# Patient Record
Sex: FEMALE | Race: WHITE
[De-identification: names, ages, dates, MRNs, and addresses within clinical notes are randomized per-mention and may not be internally consistent; named-entity substitution may affect disease eponyms.]

---

## 2018-01-10 ENCOUNTER — HOSPITAL ENCOUNTER (EMERGENCY)
Dept: HOSPITAL 75 - ER | Age: 23
LOS: 1 days | Discharge: HOME | End: 2018-01-11
Payer: SELF-PAY

## 2018-01-10 VITALS — BODY MASS INDEX: 53.92 KG/M2 | HEIGHT: 62 IN | WEIGHT: 293 LBS

## 2018-01-10 DIAGNOSIS — Z86.14: ICD-10-CM

## 2018-01-10 DIAGNOSIS — L02.414: ICD-10-CM

## 2018-01-10 DIAGNOSIS — Z90.89: ICD-10-CM

## 2018-01-10 DIAGNOSIS — L03.114: Primary | ICD-10-CM

## 2018-01-10 DIAGNOSIS — J45.909: ICD-10-CM

## 2018-01-10 DIAGNOSIS — I10: ICD-10-CM

## 2018-01-11 VITALS — DIASTOLIC BLOOD PRESSURE: 102 MMHG | SYSTOLIC BLOOD PRESSURE: 148 MMHG

## 2018-01-11 NOTE — ED GENERAL
General


Chief Complaint:  Skin/Wound Problems


Stated Complaint:  ARM WOUND


Nursing Triage Note:  


pt reports warm, red, tender area to left upper arm x 2 days.


Nursing Sepsis Screen:  No Definite Risk


 (TOÑO HENRY MEDICAL STUDENT)





History of Present Illness


Time Seen by Provider:  00:30


Initial Comments


Pt is a 23 yo female with PMH of MRSA who presents to the ED via PV c/o an 

abscess on the inside of her L upper arm that started ~ 3 days ago. Pt states 

she has just used some warm compresses and has been "squeezing" it a few times 

a day. Hasn't taken any medication for pain. Pt has a hx of MRSA abscesses and 

states that she just moved here to [place] a few months ago and has been getting 

more of these since moving here. Denies any fevers, chills, or any other 

associated sx.


 (TOÑO HENRY MEDICAL STUDENT)





Allergies and Home Medications


Allergies


Coded Allergies:  


     No Known Drug Allergies (Unverified , 1/10/18)





Home Medications


Sulfamethoxazole/Trimethoprim 1 Each Tablet, 1 EACH PO BID, #14


   Prescribed by: ROSANNA LEWIS on 1/11/18 0045





Constitutional:  no symptoms reported, No chills, No diaphoresis


EENTM:  no symptoms reported, No ear pain, No mouth pain, No throat pain


Respiratory:  no symptoms reported, No cough, No dyspnea on exertion


Cardiovascular:  no symptoms reported, No chest pain, No palpitations


Gastrointestinal:  no symptoms reported, No abdominal pain, No constipation, No 

diarrhea


Skin:  change in color (redness), other (abscess on L arm) (TOÑO HENRY MEDICAL 

STUDENT)





All Other Systems Reviewed


Negative Unless Noted:  Yes (Negative excepted noted.)


 (TOÑO HENRY MEDICAL STUDENT)





Past Medical-Social-Family Hx


Patient Social History


Alcohol Use:  Denies Use


Recreational Drug Use:  No


Smoking Status:  Never a Smoker


Recent Foreign Travel:  No


Contact w/Someone Who Travel:  No


Recent Infectious Disease Expo:  No


Recent Hopitalizations:  No


Physical Abuse:  No


Sexual Abuse:  No


Mistreated:  No


Fear:  No


 (TOÑO HENRY MEDICAL STUDENT)





Seasonal Allergies


Seasonal Allergies:  No


 (HENRY,ROSS MEDICAL STUDENT)





Surgeries


History of Surgeries:  Yes


Surgeries:  Adenoidectomy, Tonsillectomy


 (TOÑO HENRY MEDICAL STUDENT)





Respiratory


History of Respiratory Disorde:  Yes


Respiratory Disorders:  Asthma


 (TOÑO HENRY MEDICAL STUDENT)





Cardiovascular


History of Cardiac Disorders:  Yes


Cardiac Disorders:  Hypertension


 (TOÑO HENRY MEDICAL STUDENT)





Reproductive System


Last Menstrual Period:  Dec 21, 2017


 (TOÑO HENRY MEDICAL STUDENT)





Psychosocial


Suicide Risk Score:  0


 (TOÑO HENRY MEDICAL STUDENT)





Physical Exam


Vital Signs





Vital Sign - Last 12Hours








 1/10/18





 22:34


 


Temp 97.8


 


Pulse 100


 


Resp 18


 


B/P (MAP) 160/102 (121)








 (ROSANNA LEONARD MD)


Vital Signs


Capillary Refill : Less Than 3 Seconds 


 (TOÑO HENRY MEDICAL STUDENT)


General Appearance:  No Apparent Distress, WD/WN, Obese


HEENT:  PERRL/EOMI, Pharynx Normal


Neck:  Full Range of Motion, Normal Inspection


Respiratory:  Lungs Clear, Normal Breath Sounds, No Accessory Muscle Use


Cardiovascular:  Regular Rate, Rhythm, No Murmur


Gastrointestinal:  Normal Bowel Sounds, Non Tender, No Guarding, No Rebound


Extremity:  Normal Capillary Refill, Normal Range of Motion, Non Tender, No 

Calf Tenderness


Neurologic/Psychiatric:  Oriented x3, No Motor/Sensory Deficits


Skin:  Warm/Dry, Other (1x2cm abscess on L upper arm, medial surrounded by a 

5cm diameter area of erythema) (TOÑO HENRY MEDICAL STUDENT)





Progress/Results/Core Measures


Suspected Sepsis


Recent Fever Within 48 Hours:  No


Infection Criteria Present:  Suspected New Infection


New/Unexplained  Altered Menta:  No


Sepsis Screen:  No Definite Risk


Sepsis Diagnosis:  


SIRS


Temperature:97.8 


Pulse: 100 


Respiratory Rate: 18


 


Blood Pressure 160 /102 


Mean: 121


 


 (TOÑO HENRY MEDICAL STUDENT)





Results/Orders


My Orders





Orders - ROSANNA LEONARD MD


Sulfamethoxazole/Trimet Ds Tab (Bactrim (1/11/18 00:15)


 (ROSANNA LEONARD MD)


Vital Signs/I&O





Vital Sign - Last 12Hours








 1/10/18





 22:34


 


Temp 97.8


 


Pulse 100


 


Resp 18


 


B/P (MAP) 160/102 (121)








 (ROSANNA LEONARD MD)


Vital Signs/I&O


Capillary Refill : Less Than 3 Seconds 


 (TOÑO HENRY MEDICAL STUDENT)








Blood Pressure Mean:  121











Departure


Impression


Impression:  


 Primary Impression:  


 Cellulitis and abscess of upper arm and forearm


 Additional Impression:  


 Encounter for incision and drainage procedure


Disposition:  01 HOME, SELF-CARE


Condition:  Improved





Departure-Patient Inst.


Decision time for Depature:  00:44


 (ROSANNA LEONARD MD)


Referrals:  


NO,LOCAL PHYSICIAN (PCP)


Primary Care Physician


Patient Instructions:  Abscess Incision and Drainage (DC), Cellulitis (Skin 

Infection), Adult (DC)





Add. Discharge Instructions:  


Complete your antibiotics as prescribed.  Continue to use warm soaks in the 

bathtub or warm compresses multiple times a day to encourage draining.  Keep 

covered as long as the wound is draining.  Return to care if symptoms worsen.


To prevent future abscesses, consider bathing once or twice a week with 

chlorhexidine soap (Hibiclens).  This can be found over-the-counter at 

pharmacies or [facility].











All discharge instructions reviewed with patient and/or family. Voiced 

understanding.


Scripts


Sulfamethoxazole/Trimethoprim (Bactrim Ds Tablet) 1 Each Tablet


1 EACH PO BID, #14 TAB


   Prov: ROSANNA LEONARD MD         1/11/18











TOÑO HENRY MEDICAL STUDENT Jan 11, 2018 00:33


ROSANNA LEONARD MD Jan 11, 2018 00:46

## 2018-03-16 ENCOUNTER — HOSPITAL ENCOUNTER (EMERGENCY)
Dept: HOSPITAL 75 - ER | Age: 23
Discharge: HOME | End: 2018-03-16
Payer: SELF-PAY

## 2018-03-16 VITALS — SYSTOLIC BLOOD PRESSURE: 137 MMHG | DIASTOLIC BLOOD PRESSURE: 99 MMHG

## 2018-03-16 VITALS — BODY MASS INDEX: 53.92 KG/M2 | WEIGHT: 293 LBS | HEIGHT: 62 IN

## 2018-03-16 DIAGNOSIS — Z90.89: ICD-10-CM

## 2018-03-16 DIAGNOSIS — I10: ICD-10-CM

## 2018-03-16 DIAGNOSIS — F41.9: ICD-10-CM

## 2018-03-16 DIAGNOSIS — R10.13: Primary | ICD-10-CM

## 2018-03-16 DIAGNOSIS — F32.9: ICD-10-CM

## 2018-03-16 LAB
ALBUMIN SERPL-MCNC: 4.2 GM/DL (ref 3.2–4.5)
ALP SERPL-CCNC: 61 U/L (ref 40–136)
ALT SERPL-CCNC: 53 U/L (ref 0–55)
BASOPHILS # BLD AUTO: 0.1 10^3/UL (ref 0–0.1)
BASOPHILS NFR BLD AUTO: 1 % (ref 0–10)
BILIRUB SERPL-MCNC: 0.8 MG/DL (ref 0.1–1)
BUN/CREAT SERPL: 29
CALCIUM SERPL-MCNC: 9.5 MG/DL (ref 8.5–10.1)
CHLORIDE SERPL-SCNC: 105 MMOL/L (ref 98–107)
CO2 SERPL-SCNC: 21 MMOL/L (ref 21–32)
CREAT SERPL-MCNC: 0.66 MG/DL (ref 0.6–1.3)
EOSINOPHIL # BLD AUTO: 0.4 10^3/UL (ref 0–0.3)
EOSINOPHIL NFR BLD AUTO: 4 % (ref 0–10)
ERYTHROCYTE [DISTWIDTH] IN BLOOD BY AUTOMATED COUNT: 13.5 % (ref 10–14.5)
GFR SERPLBLD BASED ON 1.73 SQ M-ARVRAT: > 60 ML/MIN
GLUCOSE SERPL-MCNC: 103 MG/DL (ref 70–105)
HCT VFR BLD CALC: 40 % (ref 35–52)
HGB BLD-MCNC: 13.5 G/DL (ref 11.5–16)
LYMPHOCYTES # BLD AUTO: 3.1 X 10^3 (ref 1–4)
LYMPHOCYTES NFR BLD AUTO: 28 % (ref 12–44)
MANUAL DIFFERENTIAL PERFORMED BLD QL: NO
MCH RBC QN AUTO: 28 PG (ref 25–34)
MCHC RBC AUTO-ENTMCNC: 34 G/DL (ref 32–36)
MCV RBC AUTO: 82 FL (ref 80–99)
MONOCYTES # BLD AUTO: 1 X 10^3 (ref 0–1)
MONOCYTES NFR BLD AUTO: 9 % (ref 0–12)
NEUTROPHILS # BLD AUTO: 6.3 X 10^3 (ref 1.8–7.8)
NEUTROPHILS NFR BLD AUTO: 58 % (ref 42–75)
PLATELET # BLD: 238 10^3/UL (ref 130–400)
PMV BLD AUTO: 10.3 FL (ref 7.4–10.4)
POTASSIUM SERPL-SCNC: 4.4 MMOL/L (ref 3.6–5)
PROT SERPL-MCNC: 7.7 GM/DL (ref 6.4–8.2)
RBC # BLD AUTO: 4.88 10^6/UL (ref 4.35–5.85)
SODIUM SERPL-SCNC: 137 MMOL/L (ref 135–145)
WBC # BLD AUTO: 10.8 10^3/UL (ref 4.3–11)

## 2018-03-16 PROCEDURE — 84703 CHORIONIC GONADOTROPIN ASSAY: CPT

## 2018-03-16 PROCEDURE — 85025 COMPLETE CBC W/AUTO DIFF WBC: CPT

## 2018-03-16 PROCEDURE — 71046 X-RAY EXAM CHEST 2 VIEWS: CPT

## 2018-03-16 PROCEDURE — 85379 FIBRIN DEGRADATION QUANT: CPT

## 2018-03-16 PROCEDURE — 93005 ELECTROCARDIOGRAM TRACING: CPT

## 2018-03-16 PROCEDURE — 36415 COLL VENOUS BLD VENIPUNCTURE: CPT

## 2018-03-16 PROCEDURE — 80053 COMPREHEN METABOLIC PANEL: CPT

## 2018-03-16 NOTE — ED GENERAL
General


Chief Complaint:  Psych/Social Disorder


Stated Complaint:  CHEST DISCOMFORT/COUGH


Source of Information:  Patient


Exam Limitations:  No Limitations





History of Present Illness


Date Seen by Provider:  Mar 16, 2018


Time Seen by Provider:  08:05


Initial Comments


Here with report of central chest discomfort or epigastric discomfort.  This 

woke her up this morning.  She felt short of breath and had burning pain.  She 

is not sure what this is.  She is very anxious with this.  She had an episode 

like this previously long time ago but has not had any since.  They told her it 

was a panic attack.  She reports sleeping on the couch.  States the previous 

episode occurred where she was sleeping as well.  Currently feeling a little 

better.  Reports that she had a recent trip to [place] and back a week ago.


Timing/Duration:  1/2 Hour


Severity:  Moderate


Associated Systoms:  Chest Pain, No Diaphoresis, No Fever/Chills, No Nausea/

Vomiting, Shortness of Air, No Weakness





Allergies and Home Medications


Allergies


Coded Allergies:  


     No Known Drug Allergies (Unverified , 1/10/18)





Home Medications


Sulfamethoxazole/Trimethoprim 1 Each Tablet, 1 EACH PO BID


   Prescribed by: ROSANNA LEWIS on 18 0045





Patient Home Medication List


Home Medication List Reviewed:  Yes





Constitutional:  see HPI, No chills, No fever


EENTM:  no symptoms reported


Respiratory:  No cough, short of breath, No wheezing


Cardiovascular:  No chest pain, No edema


Gastrointestinal:  abdominal pain (epigastric), No nausea, No vomiting


Genitourinary:  no symptoms reported, No dysuria


Musculoskeletal:  no symptoms reported


Skin:  no symptoms reported


Psychiatric/Neurological:  Anxiety, Denies Emotional Problems


Hematologic/Lymphatic:  No Symptoms Reported


All Other Systems Reviewed


Negative Unless Noted:  Yes





Past Medical-Social-Family Hx


Patient Social History


Alcohol Use:  Denies Use


Recreational Drug Use:  No


Smoking Status:  Never a Smoker


2nd Hand Smoke Exposure:  No


Recent Foreign Travel:  No


Contact w/Someone Who Travel:  No


Recent Hopitalizations:  No





Seasonal Allergies


Seasonal Allergies:  No





Surgeries


History of Surgeries:  Yes


Surgeries:  Adenoidectomy, Tonsillectomy





Respiratory


History of Respiratory Disorde:  Yes


Respiratory Disorders:  Asthma





Cardiovascular


History of Cardiac Disorders:  Yes


Cardiac Disorders:  Hypertension





Genitourinary


History of Genitourinary Disor:  No





Gastrointestinal


History of Gastrointestinal Di:  No





Musculoskeletal


History of Musculoskeletal Dis:  No





Psychosocial


Behavioral Health Disorders:  Anxiety, Depression





Reviewed Nursing Assessment


Reviewed/Agree w Nursing PMH:  Yes





Family Medical History


Significant Family History:  No Pertinent Family Hx





Physical Exam


Vital Signs





Vital Signs - First Documented








 3/16/18





 08:11


 


Temp 97.5


 


Pulse 85


 


Resp 20


 


B/P (MAP) 178/114 (135)


 


Pulse Ox 100


 


O2 Delivery Room Air





Capillary Refill :


General Appearance:  No Apparent Distress, WD/WN, Obese


HEENT:  PERRL/EOMI, Pharynx Normal


Neck:  Non Tender, Supple


Respiratory:  Lungs Clear, Normal Breath Sounds


Cardiovascular:  Regular Rate, Rhythm, No Murmur


Gastrointestinal:  Non Tender, Soft


Back:  Normal Inspection, No CVA Tenderness, No Vertebral Tenderness


Extremity:  Normal Range of Motion, Non Tender


Neurologic/Psychiatric:  Alert, Oriented x3


Skin:  Normal Color, Warm/Dry





Progress/Results/Core Measures


Suspected Sepsis


SIRS


Temperature: 


Pulse:  


Respiratory Rate: 


 


Laboratory Tests


3/16/18 08:53: White Blood Count 10.8


Blood Pressure  / 


Mean: 


 





Laboratory Tests


3/16/18 08:53: 


Creatinine 0.66, Platelet Count 238, Total Bilirubin 0.8








Results/Orders


Lab Results





Laboratory Tests








Test


  3/16/18


08:53 Range/Units


 


 


White Blood Count


  10.8 


  4.3-11.0


10^3/uL


 


Red Blood Count


  4.88 


  4.35-5.85


10^6/uL


 


Hemoglobin 13.5  11.5-16.0  G/DL


 


Hematocrit 40  35-52  %


 


Mean Corpuscular Volume 82  80-99  FL


 


Mean Corpuscular Hemoglobin 28  25-34  PG


 


Mean Corpuscular Hemoglobin


Concent 34 


  32-36  G/DL


 


 


Red Cell Distribution Width 13.5  10.0-14.5  %


 


Platelet Count


  238 


  130-400


10^3/uL


 


Mean Platelet Volume 10.3  7.4-10.4  FL


 


Neutrophils (%) (Auto) 58  42-75  %


 


Lymphocytes (%) (Auto) 28  12-44  %


 


Monocytes (%) (Auto) 9  0-12  %


 


Eosinophils (%) (Auto) 4  0-10  %


 


Basophils (%) (Auto) 1  0-10  %


 


Neutrophils # (Auto) 6.3  1.8-7.8  X 10^3


 


Lymphocytes # (Auto) 3.1  1.0-4.0  X 10^3


 


Monocytes # (Auto) 1.0  0.0-1.0  X 10^3


 


Eosinophils # (Auto)


  0.4 H


  0.0-0.3


10^3/uL


 


Basophils # (Auto)


  0.1 


  0.0-0.1


10^3/uL


 


D-Dimer


  0.42 


  0.00-0.49


UG/ML


 


Sodium Level 137  135-145  MMOL/L


 


Potassium Level 4.4  3.6-5.0  MMOL/L


 


Chloride Level 105    MMOL/L


 


Carbon Dioxide Level 21  21-32  MMOL/L


 


Anion Gap 11  5-14  MMOL/L


 


Blood Urea Nitrogen 19 H 7-18  MG/DL


 


Creatinine


  0.66 


  0.60-1.30


MG/DL


 


Estimat Glomerular Filtration


Rate > 60 


   


 


 


BUN/Creatinine Ratio 29   


 


Glucose Level 103    MG/DL


 


Calcium Level 9.5  8.5-10.1  MG/DL


 


Total Bilirubin 0.8  0.1-1.0  MG/DL


 


Aspartate Amino Transf


(AST/SGOT) 39 H


  5-34  U/L


 


 


Alanine Aminotransferase


(ALT/SGPT) 53 


  0-55  U/L


 


 


Alkaline Phosphatase 61    U/L


 


Total Protein 7.7  6.4-8.2  GM/DL


 


Albumin 4.2  3.2-4.5  GM/DL








My Orders





Orders - OTTO RODRIGES MD


Lidocaine 2% Viscous 15 Ml (Xylocaine Vi (3/16/18 08:15)


Antacid  Suspension (Mylanta  Suspension (3/16/18 08:15)


Urine Pregnancy Bedside (3/16/18 08:10)


Ekg Tracing (3/16/18 08:10)


Chest Pa/Lat (2 View) (3/16/18 08:10)


Cbc With Automated Diff (3/16/18 08:10)


Comprehensive Metabolic Panel (3/16/18 08:10)


Fibrin Degradation Products (3/16/18 08:10)





Medications Given in ED





Current Medications








 Medications  Dose


 Ordered  Sig/Deshaun


 Route  Start Time


 Stop Time Status Last Admin


Dose Admin


 


 Al Hydrox/Mg


 Hydrox/Simethicone  30 ml  ONCE  ONCE


 PO  3/16/18 08:15


 3/16/18 08:16 DC 3/16/18 08:40


30 ML


 


 Lidocaine HCl  15 ml  ONCE  ONCE


 PO  3/16/18 08:15


 3/16/18 08:16  3/16/18 08:40


15 ML








Vital Signs/I&O





Vital Sign - Last 12Hours








 3/16/18





 08:11


 


Temp 97.5


 


Pulse 85


 


Resp 20


 


B/P (MAP) 178/114 (135)


 


Pulse Ox 100


 


O2 Delivery Room Air





Capillary Refill :


Progress Note :  


Progress Note


Seen and evaluated.  EKG, chest x-ray and labs ordered.  UCG ordered.  GI 

cocktail ordered.  Monitor patient..





Cardiac echo.  0955: No acute findings on labs or chest x-ray.  Discharged home 

with return precautions.  Patient verbalized understanding instructions and 

agreement with plan.  Pepcid 20 mg IV given prior to discharge for some mild 

reflux symptoms that are starting to return.





ECG


Initial ECG Impression Date:  Mar 16, 2018


Initial ECG Impression Time:  08:21


Initial ECG Rate:  84


Initial ECG Rhythm:  Normal Sinus


Initial ECG Impression:  Normal


Initial ECG Comparisson:  No Previous ECG Available


Comment


Sinus rhythm with inferior Q waves.  No evidence of ST elevation MI.  No 

previous available for comparison.  Interpreted by me.





Diagnostic Imaging





   Diagonstic Imaging:  Xray


   Plain Films/CT/US/NM/MRI:  chest


Comments


 [facility].


 [place], [place]





NAME:   PHAM DEE


MED REC#:   [id number]


ACCOUNT#:   [id number]


PT STATUS:   REG ER


:   1995


PHYSICIAN:   OTTO RODRIGES MD


ADMIT DATE:   18/ER


 ***Draft***


Date of Exam:18





CHEST PA/LAT (2 VIEW)








INDICATION: Chest pain.





FINDINGS:  


The lungs are clear. The heart size and vascularity are within


normal limits. There is no effusion or pneumothorax. 





IMPRESSION:


No acute appearing abnormality.





  Dictated on workstation # [id number]








Dict:   18 [id number]


Trans:   18 0952


[facility] [id number]





Interpreted by:     ALBERTO FRENCH


Electronically signed by:





Departure


Impression


Impression:  


 Primary Impression:  


 Epigastric pain


Disposition:  01 HOME, SELF-CARE


Condition:  Improved





Departure-Patient Inst.


Decision time for Depature:  10:02


Referrals:  


NO,LOCAL PHYSICIAN (PCP/Family)


Primary Care Physician


Patient Instructions:  Acid Reflux (Gastroesophageal Reflux Disease) in Adults, 

Acute Abdomen (Belly Pain), Adult (DC)





Add. Discharge Instructions:  


All discharge instructions reviewed with patient and/or family. Voiced 

understanding.





You may take over-the-counter stomach medicine such as omeprazole.  Take this 

daily for 2 weeks and you may repeat this 2 for an additional 4 weeks of 

treatment as needed.  You may also take Pepcid or the generic famotidine 20 mg 

daily as needed for reflux symptoms.  You were noted to have high blood 

pressure here.  It is important that you have this rechecked and evaluated 

further as you may need treatment for this.  You should obtain a local doctor 

and follow-up for recheck and further evaluation.  You also need recheck 

related to your stomach problems as you may need further referral to a surgeon 

for upper endoscopy (scope).  Clear liquid or light diet for the next 24 hours 

and then advance as tolerated.  You should avoid spicy or heavy foods.  Return 

for worse pain, fever, vomiting, weakness, breathing problems or other concerns 

as needed.


Work/School Note:  Local Medical Staff Listing











OTTO RODRIGES MD Mar 16, 2018 08:26

## 2018-03-16 NOTE — DIAGNOSTIC IMAGING REPORT
INDICATION: Chest pain.



FINDINGS:  

The lungs are clear. The heart size and vascularity are within

normal limits. There is no effusion or pneumothorax. 



IMPRESSION:

No acute appearing abnormality.



Dictated by: 



  Dictated on workstation # [id number]